# Patient Record
(demographics unavailable — no encounter records)

---

## 2025-02-07 NOTE — DISCUSSION/SUMMARY
[FreeTextEntry1] : In [place]  64 year old gentleman - Works in [employer] - history of HTN, HL/Obesity, CAD S/p CABG ([facility]) 5/2015 - presenting to establish care after Dr. Lantigua ============ ============  HL/Obesity, CAD S/p CABG ([facility]) 5/2015/ Pre-Diabetes - ASA - Statin - Atorvastatom - Consider GLP1RA/[id number] - Counseled  HTN - Lsinopril - Metoprolol XL 25  Cholesterol management - Assessed - Impression is active; changes as per above - Discussed diet and exercise at length - Any lifestyle/medication changes as per above   Risk factors for cardiomyopathy - Assessed - Impression is stable - Reviewed records from PCP   BP - Assessed - Impression is active   Cardiac Health optimization - Discussed diet and exercise at length - Discussed importance of monitoring and re-assessment of cardiac health on further visits          EKG - for monitoring of heart disease                                                                      30 minutes spent in patient encounter explaining and formulating rationale for treatment plan.                                                [EKG obtained to assist in diagnosis and management of assessed problem(s)] : EKG obtained to assist in diagnosis and management of assessed problem(s)

## 2025-02-07 NOTE — DISCUSSION/SUMMARY
[FreeTextEntry1] : In summ[place]  64 year old gentleman - Works in [employer] - history of HTN, HL/Obesity, CAD S/p CABG ([facility]) 5/2015 - presenting to establish care after Dr. Lantigua ============ ============  HL/Obesity, CAD S/p CABG ([facility]) 5/2015/ Pre-Diabetes - ASA - Statin - Atorvastatom - Consider GLP1RA/SGLT2i - Counseled  HTN - Lsinopril - Metoprolol XL 25  Cholesterol management - Assessed - Impression is active; changes as per above - Discussed diet and exercise at length - Any lifestyle/medication changes as per above   Risk factors for cardiomyopathy - Assessed - Impression is stable - Reviewed records from PCP   BP - Assessed - Impression is active   Cardiac Health optimization - Discussed diet and exercise at length - Discussed importance of monitoring and re-assessment of cardiac health on further visits          EKG - for monitoring of heart disease                                                                      30 minutes spent in patient encounter explaining and formulating rationale for treatment plan.                                                [EKG obtained to assist in diagnosis and management of assessed problem(s)] : EKG obtained to assist in diagnosis and management of assessed problem(s)

## 2025-02-07 NOTE — PHYSICAL EXAM
[Normal] : clear lung fields, good air entry, no respiratory distress [Well Developed] : well developed [Well Nourished] : well nourished [No Acute Distress] : no acute distress [Normal Conjunctiva] : normal conjunctiva [Normal Venous Pressure] : normal venous pressure [No Carotid Bruit] : no carotid bruit [Normal S1, S2] : normal S1, S2 [No Murmur] : no murmur [No Rub] : no rub [No Gallop] : no gallop [Clear Lung Fields] : clear lung fields [Good Air Entry] : good air entry [No Respiratory Distress] : no respiratory distress  [Soft] : abdomen soft [Non Tender] : non-tender [No Masses/organomegaly] : no masses/organomegaly [Normal Bowel Sounds] : normal bowel sounds [Normal Gait] : normal gait [No Edema] : no edema [No Cyanosis] : no cyanosis [No Clubbing] : no clubbing [No Varicosities] : no varicosities [No Rash] : no rash [No Skin Lesions] : no skin lesions [Moves all extremities] : moves all extremities [No Focal Deficits] : no focal deficits [Normal Speech] : normal speech [Alert and Oriented] : alert and oriented [Normal memory] : normal memory